# Patient Record
Sex: FEMALE | Race: WHITE | NOT HISPANIC OR LATINO | URBAN - METROPOLITAN AREA
[De-identification: names, ages, dates, MRNs, and addresses within clinical notes are randomized per-mention and may not be internally consistent; named-entity substitution may affect disease eponyms.]

---

## 2023-10-31 ENCOUNTER — OFFICE VISIT (OUTPATIENT)
Dept: URBAN - METROPOLITAN AREA CLINIC 31 | Facility: CLINIC | Age: 58
End: 2023-10-31

## 2023-11-07 ENCOUNTER — DASHBOARD ENCOUNTERS (OUTPATIENT)
Age: 58
End: 2023-11-07

## 2023-11-07 ENCOUNTER — OFFICE VISIT (OUTPATIENT)
Dept: URBAN - METROPOLITAN AREA CLINIC 31 | Facility: CLINIC | Age: 58
End: 2023-11-07
Payer: COMMERCIAL

## 2023-11-07 ENCOUNTER — LAB OUTSIDE AN ENCOUNTER (OUTPATIENT)
Dept: URBAN - METROPOLITAN AREA CLINIC 31 | Facility: CLINIC | Age: 58
End: 2023-11-07

## 2023-11-07 VITALS
DIASTOLIC BLOOD PRESSURE: 70 MMHG | SYSTOLIC BLOOD PRESSURE: 120 MMHG | WEIGHT: 147 LBS | HEART RATE: 67 BPM | BODY MASS INDEX: 22.28 KG/M2 | HEIGHT: 68 IN | OXYGEN SATURATION: 98 %

## 2023-11-07 DIAGNOSIS — K20.0 EOSINOPHILIC ESOPHAGITIS: ICD-10-CM

## 2023-11-07 DIAGNOSIS — Z86.010 HISTORY OF COLON POLYPS: ICD-10-CM

## 2023-11-07 PROBLEM — 428283002: Status: ACTIVE | Noted: 2023-11-07

## 2023-11-07 PROCEDURE — 99203 OFFICE O/P NEW LOW 30 MIN: CPT | Performed by: STUDENT IN AN ORGANIZED HEALTH CARE EDUCATION/TRAINING PROGRAM

## 2023-11-07 NOTE — HPI-TODAY'S VISIT:
58 year old female patient presents today for surveillance colonoscopy. She currently denies mucus, melena or blood in stools. No pruritus ani or rectal pain reported. Hx suspected EoE. Has responded well to dietary elimination. She reports is ok with not further adjusting elimination to single or second food. Avoid dairy, wheat, nuts, fish, soy. Denies UGI symptoms at this time. No dysphagia. No heartburn. No complications with anesthesia. Admits family history of colon cancer - MGM. Mother hx polyps.   Last colonoscopy 11/27/2017 - Benign neoplasm polyps of descending colon - Benign neoplasm polyp of ascending colon - Diverticulosis of large intestine - Hemorrhoids first degree  Pathology report: 1. Colon, Proximal Descending, polyp - Hyperplastic polyp (Larger portion of tissue) - Benign colonic mucosa with lymphoid aggregate (Smaller tissue) 2. Colon, Ascending, polyp - Tubular Adenoma  Last EGD02/06/2017 - Diaphragmatic hernia without obstruction or gangrene - No evidence of esophageal stricture or ring  Pathology report: 1. Esophagus, Lower biopsy A. Benign squamous mucosa along with detached fragments of superficial squamous epithelium showing histological changes consistent with reflux esophagitis B. Negative for intestinal metaplasia C. No Helicobacter-Like organisms identified D. PAS stain negative for fungal elements 2. Esophagus, Upper biopsy A. Benign squamous mucosa along with detached fragments of superficial squamous epithelium showing no significant histopathological abnormality B. Negative for intestinal metaplasia C. No Helicobacter-Like organisms identified D. PAS stain negative for fungal elements

## 2023-11-30 ENCOUNTER — CLAIMS CREATED FROM THE CLAIM WINDOW (OUTPATIENT)
Dept: URBAN - METROPOLITAN AREA SURGERY CENTER 8 | Facility: SURGERY CENTER | Age: 58
End: 2023-11-30
Payer: COMMERCIAL

## 2023-11-30 ENCOUNTER — CLAIMS CREATED FROM THE CLAIM WINDOW (OUTPATIENT)
Dept: URBAN - METROPOLITAN AREA CLINIC 4 | Facility: CLINIC | Age: 58
End: 2023-11-30
Payer: COMMERCIAL

## 2023-11-30 DIAGNOSIS — Z86.010 ADENOMAS PERSONAL HISTORY OF COLONIC POLYPS: ICD-10-CM

## 2023-11-30 DIAGNOSIS — Z12.11 COLON CANCER SCREENING: ICD-10-CM

## 2023-11-30 DIAGNOSIS — K63.5 BENIGN COLON POLYPS: ICD-10-CM

## 2023-11-30 DIAGNOSIS — D12.2 ADENOMA OF ASCENDING COLON: ICD-10-CM

## 2023-11-30 DIAGNOSIS — K63.89 APPENDICITIS EPIPLOICA: ICD-10-CM

## 2023-11-30 DIAGNOSIS — K63.89 OTHER SPECIFIED DISEASES OF INTESTINE: ICD-10-CM

## 2023-11-30 DIAGNOSIS — Z86.010 PERSONAL HISTORY OF COLONIC POLYPS: ICD-10-CM

## 2023-11-30 DIAGNOSIS — K57.30 DIVERTICULA OF COLON: ICD-10-CM

## 2023-11-30 DIAGNOSIS — K64.8 OTHER HEMORRHOIDS: ICD-10-CM

## 2023-11-30 PROCEDURE — G8907 PT DOC NO EVENTS ON DISCHARG: HCPCS | Performed by: STUDENT IN AN ORGANIZED HEALTH CARE EDUCATION/TRAINING PROGRAM

## 2023-11-30 PROCEDURE — 00811 ANES LWR INTST NDSC NOS: CPT | Performed by: NURSE ANESTHETIST, CERTIFIED REGISTERED

## 2023-11-30 PROCEDURE — 88305 TISSUE EXAM BY PATHOLOGIST: CPT | Performed by: PATHOLOGY

## 2023-11-30 PROCEDURE — 45385 COLONOSCOPY W/LESION REMOVAL: CPT | Performed by: STUDENT IN AN ORGANIZED HEALTH CARE EDUCATION/TRAINING PROGRAM
